# Patient Record
(demographics unavailable — no encounter records)

---

## 2025-02-12 NOTE — PHYSICAL EXAM
[General Appearance - Alert] : alert [General Appearance - In No Acute Distress] : in no acute distress [Ankle Swelling (On Exam)] : present [Ankle Swelling Bilaterally] : bilaterally  [Ankle Swelling On The Left] : moderate [Varicose Veins Of Lower Extremities] : not present [] : not present [Delayed in the Right Toes] : capillary refills normal in right toes [Delayed in the Left Toes] : capillary refills normal in the left toes [2+] : left foot dorsalis pedis 2+ [de-identified] : Patient with hammertoe deformities second digits bilaterally causing distal lesion and pain. [Sensation] : the sensory exam was normal to light touch and pinprick [No Focal Deficits] : no focal deficits [Deep Tendon Reflexes (DTR)] : deep tendon reflexes were 2+ and symmetric [Motor Exam] : the motor exam was normal [FreeTextEntry1] : Patient with some back issues causing some burning and pain both feet. [Oriented To Time, Place, And Person] : oriented to person, place, and time [Impaired Insight] : insight and judgment were intact [Affect] : the affect was normal

## 2025-02-12 NOTE — REASON FOR VISIT
[Follow-Up Visit] : a follow-up visit for [Foot Deformity] : foot deformity [Onychomycosis] : onychomycosis

## 2025-02-12 NOTE — ASSESSMENT
[FreeTextEntry1] : Patient with hammertoe deformities second digit bilateral we discussed conservative and surgical options patient having back issues as well causing pain in the feet.  Patient hammertoe deformities patient has thickened fungal toenails we discussed conservative care consisting of stretching and splinting of the digits we also discussed shoe gear changes.  In addition sterile debridement was performed of the nails of the fungal using a double-action nail clipper and electrical bur.  Patient will follow-up as needed.

## 2025-02-12 NOTE — PHYSICAL EXAM
[General Appearance - Alert] : alert [General Appearance - In No Acute Distress] : in no acute distress [Ankle Swelling (On Exam)] : present [Ankle Swelling Bilaterally] : bilaterally  [Ankle Swelling On The Left] : moderate [Varicose Veins Of Lower Extremities] : not present [] : not present [Delayed in the Right Toes] : capillary refills normal in right toes [Delayed in the Left Toes] : capillary refills normal in the left toes [2+] : left foot dorsalis pedis 2+ [de-identified] : Patient with hammertoe deformities second digits bilaterally causing distal lesion and pain. [Sensation] : the sensory exam was normal to light touch and pinprick [No Focal Deficits] : no focal deficits [Deep Tendon Reflexes (DTR)] : deep tendon reflexes were 2+ and symmetric [Motor Exam] : the motor exam was normal [FreeTextEntry1] : Patient with some back issues causing some burning and pain both feet. [Oriented To Time, Place, And Person] : oriented to person, place, and time [Impaired Insight] : insight and judgment were intact [Affect] : the affect was normal

## 2025-07-27 NOTE — ASSESSMENT
[FreeTextEntry1] : Patient with hammertoe deformities second digit bilateral we discussed conservative and surgical options patient having back issues as well causing pain in the feet.  Patient hammertoe deformities patient has thickened fungal toenails we discussed conservative care consisting of stretching and splinting of the digits we also discussed shoe gear changes.  In addition sterile debridement was performed of the nails of the fungal using a double-action nail clipper and electrical bur.  Patient will follow-up as needed.  Patient has neuropathy so I explained the danger of hammertoes causing distal lesions is greater since patient cannot always feel when lesions can become infected.  We discussed possible surgery we will continue with conservative care. Fungal toenails were debrided using a double-action nail clipper a curette and an electrical file. After debridement the nails were cleaned with alcohol.  The patient had much less pain on palpation of the nails after debridement was performed.  Due to the severe thickening and onychomycotic nature of the involved toenails the patient understands patient not to trim their own nails.  We discussed treatment options for treating nail fungus using oral medication topical medication and laser.  Part of appropriate treatment will be continued debridement of the nails every few months.  All questions answered and reviewed.

## 2025-07-27 NOTE — PHYSICAL EXAM
[General Appearance - Alert] : alert [General Appearance - In No Acute Distress] : in no acute distress [General Appearance - Well Nourished] : well nourished [Ankle Swelling (On Exam)] : present [Ankle Swelling Bilaterally] : bilaterally  [Ankle Swelling On The Left] : moderate [Varicose Veins Of Lower Extremities] : not present [] : not present [Delayed in the Right Toes] : capillary refills normal in right toes [Delayed in the Left Toes] : capillary refills normal in the left toes [2+] : left foot dorsalis pedis 2+ [de-identified] : Patient with hammertoe deformities second digits bilaterally causing distal lesion and pain. [Sensation] : the sensory exam was normal to light touch and pinprick [No Focal Deficits] : no focal deficits [Deep Tendon Reflexes (DTR)] : deep tendon reflexes were 2+ and symmetric [Motor Exam] : the motor exam was normal [FreeTextEntry1] : Patient with some back issues causing some burning and pain both feet.

## 2025-07-27 NOTE — PHYSICAL EXAM
[General Appearance - Alert] : alert [General Appearance - In No Acute Distress] : in no acute distress [General Appearance - Well Nourished] : well nourished [Ankle Swelling (On Exam)] : present [Ankle Swelling Bilaterally] : bilaterally  [Ankle Swelling On The Left] : moderate [Varicose Veins Of Lower Extremities] : not present [] : not present [Delayed in the Right Toes] : capillary refills normal in right toes [Delayed in the Left Toes] : capillary refills normal in the left toes [2+] : left foot dorsalis pedis 2+ [de-identified] : Patient with hammertoe deformities second digits bilaterally causing distal lesion and pain. [Sensation] : the sensory exam was normal to light touch and pinprick [No Focal Deficits] : no focal deficits [Deep Tendon Reflexes (DTR)] : deep tendon reflexes were 2+ and symmetric [Motor Exam] : the motor exam was normal [FreeTextEntry1] : Patient with some back issues causing some burning and pain both feet.